# Patient Record
Sex: MALE | Race: WHITE | NOT HISPANIC OR LATINO | Employment: FULL TIME | URBAN - METROPOLITAN AREA
[De-identification: names, ages, dates, MRNs, and addresses within clinical notes are randomized per-mention and may not be internally consistent; named-entity substitution may affect disease eponyms.]

---

## 2017-01-03 ENCOUNTER — OFFICE VISIT (OUTPATIENT)
Dept: BARIATRICS/WEIGHT MGMT | Facility: CLINIC | Age: 52
End: 2017-01-03

## 2017-01-03 VITALS
DIASTOLIC BLOOD PRESSURE: 100 MMHG | BODY MASS INDEX: 38.36 KG/M2 | RESPIRATION RATE: 20 BRPM | HEIGHT: 76 IN | WEIGHT: 315 LBS | HEART RATE: 79 BPM | OXYGEN SATURATION: 98 % | TEMPERATURE: 98.2 F | SYSTOLIC BLOOD PRESSURE: 178 MMHG

## 2017-01-03 DIAGNOSIS — E03.9 HYPOTHYROIDISM, UNSPECIFIED TYPE: ICD-10-CM

## 2017-01-03 DIAGNOSIS — E66.01 MORBID OBESITY, UNSPECIFIED OBESITY TYPE (HCC): ICD-10-CM

## 2017-01-03 DIAGNOSIS — I10 ESSENTIAL HYPERTENSION: ICD-10-CM

## 2017-01-03 DIAGNOSIS — Z98.84 S/P BARIATRIC SURGERY: ICD-10-CM

## 2017-01-03 DIAGNOSIS — E55.9 VITAMIN D DEFICIENCY: Primary | ICD-10-CM

## 2017-01-03 DIAGNOSIS — R53.83 FATIGUE, UNSPECIFIED TYPE: ICD-10-CM

## 2017-01-03 DIAGNOSIS — M25.50 ARTHRALGIA, UNSPECIFIED JOINT: ICD-10-CM

## 2017-01-03 PROCEDURE — 99214 OFFICE O/P EST MOD 30 MIN: CPT | Performed by: PHYSICIAN ASSISTANT

## 2017-01-03 RX ORDER — METAXALONE 800 MG/1
TABLET ORAL
Refills: 5 | COMMUNITY
Start: 2016-12-23

## 2017-01-03 RX ORDER — DICLOFENAC SODIUM 75 MG/1
TABLET, DELAYED RELEASE ORAL
COMMUNITY
Start: 2016-12-27

## 2017-01-03 RX ORDER — URSODIOL 300 MG/1
CAPSULE ORAL
Refills: 5 | COMMUNITY
Start: 2016-12-23 | End: 2017-10-03

## 2017-01-03 RX ORDER — CYCLOBENZAPRINE HCL 10 MG
TABLET ORAL
Refills: 2 | COMMUNITY
Start: 2016-12-13

## 2017-01-03 NOTE — PROGRESS NOTES
[facility] Bariatric Surgery  [address]  Formerly [facility] [address]  [phone]        Patient Name:  Jefe Dominguez.  :  1965      Date of Visit: 1/3/2017      Reason for Visit:   4 months postop    now  HPI: Jefe Dominguez is a 51 y.o. male now 4 months s/p LSG/HHR by GDW on 16    Doing well.  No issues/concerns.  Getting >100g prot/day - eating meats, doing 2 protein bars/day and 2-3 premiere protein shakes/day.  1 month labs revealed low Vit D and low Phos.  Completed RX Vit D x 1 month, and semi-completed his Phos RX.  Taking MVI and iron.  On Actigall .  Not exercising d/t his joint/Charcot issues, but very active throughout the day.     Today's weight is (!) 395 lb 9.6 oz (179 kg) pounds, today's BMI is Body mass index is 48.15 kg/(m^2)., he has a  loss of 63 lbs since surgery        Past Medical History   Diagnosis Date   • CAD (coronary artery disease)      mild on cath 2016   • Charcot-Hamida-Tooth disease      does have some weakness in extremities w/ foot drop and gait disturbances.  Uses TEDs and has orthotics   • Elevated LFTs    • Foot drop, bilateral      WEARS BRACES BILATERALLY    • Heartburn    • Hypercholesteremia    • Hypertension      CONTROLLED WITH MEDS PER PT   • Hypothyroidism    • Morbid obesity    • OA (osteoarthritis)    • Pain in joint involving multiple sites    • Peripheral edema    • Sleep apnea      BiPAP compliant   • Wears glasses      Past Surgical History   Procedure Laterality Date   • [place] tooth extraction     • Colonoscopy     • Cardiac catheterization  2016     NO CORONARY STENTS PLACED    • Gastric sleeve laparoscopic N/A 2016     Procedure: GASTRIC SLEEVE LAPAROSCOPIC, LAP HIATAL HERNIA REPAIR;  Surgeon: Chavo Head MD;  Location: [facility];  Service:    • Wrist surgery Left      Outpatient Prescriptions Marked as Taking for the 1/3/17 encounter (Office Visit) with SLIME Bartlett   Medication  "Sig Dispense Refill   • cyclobenzaprine (FLEXERIL) 10 MG tablet TAKE 1/2 TO 1 TABLET BY MOUTH 3 TIMES A DAY AS NEEDED PAIN  2   • diclofenac (VOLTAREN) 75 MG EC tablet      • HYDROcodone-acetaminophen (NORCO) 5-325 MG per tablet Take 1 tablet by mouth every night.     • levothyroxine (SYNTHROID, LEVOTHROID) 75 MCG tablet Take 75 mcg by mouth daily.     • metaxalone (SKELAXIN) 800 MG tablet TAKE 1 TABLET BY MOUTH 4 TIMES A DAY AS NEEDED FOR PAIN  5   • metoprolol tartrate (LOPRESSOR) 50 MG tablet Take 50 mg by mouth daily.     • Multiple Vitamins-Minerals (MULTIVITAMIN PO) Take 1 tablet by mouth daily.     • ursodiol (ACTIGALL) 300 MG capsule TAKE 1 CAPSULE TWICE A DAY ( START ONE WEEK AFTER SURGERY FOR SIX MONTHS)  5     No Known Allergies  Social History     Social History   • Marital status:      Spouse name: N/A   • Number of children: N/A   • Years of education: N/A     Occupational History   • Not on file.     Social History Main Topics   • Smoking status: Never Smoker   • Smokeless tobacco: Never Used   • Alcohol use No   • Drug use: No   • Sexual activity: Not on file      Comment:      Other Topics Concern   • Not on file     Social History Narrative       Visit Vitals   • /100  Comment: blood pressure recheck manually   • Pulse 79   • Temp 98.2 °F (36.8 °C) (Temporal Artery )   • Resp 20   • Ht 76\" (193 cm)   • Wt (!) 395 lb 9.6 oz (179 kg)   • SpO2 98%   • BMI 48.15 kg/m2       Physical Exam   Constitutional: He appears well-developed and well-nourished.   Cardiovascular: Normal rate and regular rhythm.    Pulmonary/Chest: Effort normal.   Abdominal: Soft. There is no tenderness. No hernia.   Musculoskeletal: Normal range of motion.   Neurological: He is alert.   Skin: Skin is warm and dry.   Psychiatric: He has a normal mood and affect.         Assessment:  4 months s/p LSG/HHR by SEJAL on 9/1/16    ICD-10-CM ICD-9-CM   1. Vitamin D deficiency E55.9 268.9   2. Fatigue, unspecified type " R53.83 780.79   3. Hypothyroidism, unspecified type E03.9 244.9   4. Arthralgia, unspecified joint M25.50 719.40   5. Essential hypertension I10 401.9   6. Morbid obesity, unspecified obesity type E66.01 278.01   7. S/P bariatric surgery Z98.84 V45.86         Plan: Doing well. Continue w/ good food choices and healthy habits.  Continue protein >70g/day.  Start exercising as able.  Routine bariatric labs ordered.  Continue vitamins w/ adjustments pending lab results.  RTC sooner w/ problems.     The patient was instructed to follow up in 3 months .

## 2017-01-06 LAB
25(OH)D3+25(OH)D2 SERPL-MCNC: 14.6 NG/ML
ALBUMIN SERPL-MCNC: 4.4 G/DL (ref 3.2–4.8)
ALBUMIN/GLOB SERPL: 1.5 G/DL (ref 1.5–2.5)
ALP SERPL-CCNC: 106 U/L (ref 25–100)
ALT SERPL-CCNC: 20 U/L (ref 7–40)
AST SERPL-CCNC: 19 U/L (ref 0–33)
BASOPHILS # BLD AUTO: 0 10*3/MM3 (ref 0–0.2)
BASOPHILS NFR BLD AUTO: 0 % (ref 0–1)
BILIRUB SERPL-MCNC: 0.5 MG/DL (ref 0.3–1.2)
BUN SERPL-MCNC: 19 MG/DL (ref 9–23)
BUN/CREAT SERPL: 23.8 (ref 7–25)
CALCIUM SERPL-MCNC: 9.7 MG/DL (ref 8.7–10.4)
CHLORIDE SERPL-SCNC: 106 MMOL/L (ref 99–109)
CO2 SERPL-SCNC: 32 MMOL/L (ref 20–31)
CREAT SERPL-MCNC: 0.8 MG/DL (ref 0.6–1.3)
EOSINOPHIL # BLD AUTO: 0.05 10*3/MM3 (ref 0.1–0.3)
EOSINOPHIL NFR BLD AUTO: 0.7 % (ref 0–3)
ERYTHROCYTE [DISTWIDTH] IN BLOOD BY AUTOMATED COUNT: 14.7 % (ref 11.3–14.5)
FERRITIN SERPL-MCNC: 68 NG/ML (ref 22–322)
FOLATE SERPL-MCNC: >24 NG/ML (ref 3.2–20)
GLOBULIN SER CALC-MCNC: 2.9 GM/DL
GLUCOSE SERPL-MCNC: 90 MG/DL (ref 70–100)
HCT VFR BLD AUTO: 48.4 % (ref 38.9–50.9)
HGB BLD-MCNC: 14.9 G/DL (ref 13.1–17.5)
IMM GRANULOCYTES # BLD: 0.01 10*3/MM3 (ref 0–0.03)
IMM GRANULOCYTES NFR BLD: 0.1 % (ref 0–0.6)
IRON SERPL-MCNC: 102 MCG/DL (ref 50–175)
LYMPHOCYTES # BLD AUTO: 0.87 10*3/MM3 (ref 0.6–4.8)
LYMPHOCYTES NFR BLD AUTO: 12.8 % (ref 24–44)
MCH RBC QN AUTO: 30.2 PG (ref 27–31)
MCHC RBC AUTO-ENTMCNC: 30.8 G/DL (ref 32–36)
MCV RBC AUTO: 98.2 FL (ref 80–99)
METHYLMALONATE SERPL-SCNC: 147 NMOL/L (ref 0–378)
MONOCYTES # BLD AUTO: 0.68 10*3/MM3 (ref 0–1)
MONOCYTES NFR BLD AUTO: 10 % (ref 0–12)
NEUTROPHILS # BLD AUTO: 5.18 10*3/MM3 (ref 1.5–8.3)
NEUTROPHILS NFR BLD AUTO: 76.4 % (ref 41–71)
PHOSPHATE SERPL-MCNC: 2.6 MG/DL (ref 2.4–5.1)
PLATELET # BLD AUTO: 181 10*3/MM3 (ref 150–450)
POTASSIUM SERPL-SCNC: 4.4 MMOL/L (ref 3.5–5.5)
PREALB SERPL-MCNC: 33 MG/DL (ref 10–36)
PROT SERPL-MCNC: 7.3 G/DL (ref 5.7–8.2)
RBC # BLD AUTO: 4.93 10*6/MM3 (ref 4.2–5.76)
SODIUM SERPL-SCNC: 143 MMOL/L (ref 132–146)
VIT B1 BLD-SCNC: 221.6 NMOL/L (ref 66.5–200)
WBC # BLD AUTO: 6.79 10*3/MM3 (ref 3.5–10.8)

## 2017-01-09 RX ORDER — ERGOCALCIFEROL 1.25 MG/1
50000 CAPSULE ORAL
Qty: 12 CAPSULE | Refills: 0 | Status: SHIPPED | OUTPATIENT
Start: 2017-01-09 | End: 2017-03-28

## 2017-04-04 ENCOUNTER — OFFICE VISIT (OUTPATIENT)
Dept: BARIATRICS/WEIGHT MGMT | Facility: CLINIC | Age: 52
End: 2017-04-04

## 2017-04-04 VITALS
SYSTOLIC BLOOD PRESSURE: 149 MMHG | HEIGHT: 76 IN | HEART RATE: 78 BPM | TEMPERATURE: 97.1 F | DIASTOLIC BLOOD PRESSURE: 84 MMHG | OXYGEN SATURATION: 98 % | BODY MASS INDEX: 38.36 KG/M2 | WEIGHT: 315 LBS | RESPIRATION RATE: 24 BRPM

## 2017-04-04 DIAGNOSIS — Z98.84 S/P BARIATRIC SURGERY: ICD-10-CM

## 2017-04-04 DIAGNOSIS — I10 ESSENTIAL HYPERTENSION: ICD-10-CM

## 2017-04-04 DIAGNOSIS — E55.9 VITAMIN D DEFICIENCY: Primary | ICD-10-CM

## 2017-04-04 DIAGNOSIS — M25.50 ARTHRALGIA, UNSPECIFIED JOINT: ICD-10-CM

## 2017-04-04 DIAGNOSIS — E66.01 MORBID OBESITY, UNSPECIFIED OBESITY TYPE (HCC): ICD-10-CM

## 2017-04-04 DIAGNOSIS — R53.83 FATIGUE, UNSPECIFIED TYPE: ICD-10-CM

## 2017-04-04 PROCEDURE — 99213 OFFICE O/P EST LOW 20 MIN: CPT | Performed by: PHYSICIAN ASSISTANT

## 2017-04-04 NOTE — PROGRESS NOTES
"[facility] Bariatric Surgery  [address]  [facility] [address] [phone]        Patient Name:  Jefe Dominguez.  :  1965      Date of Visit: 2017      Reason for Visit:   7 months postop      HPI: Jefe Dominguez is a 52 y.o. male s/p LSG/HHR by GDW on 16.    Doing well.  No issues/concerns.  Says he knows he is not the poster child for WLS, but he is happy, he is glad he had surgery and \"I'm living.\"  Getting >100g prot/day - eating peanuts, protein bars and protein shakes as snacks.  Labs 1/3/17 revealed low Vit D (14.6), o/w WNL.  Doing wkly Vit D RX and continues on MVI and iron.  Still not exercising d/t his joint/Charcot issues, but very active throughout the day, and mobility continues to improve.  Plans to start swimming this summer.       Presurgery weight:  454 lbs.  Today's weight is (!) 382 lb (173 kg) pounds, today's BMI is Body mass index is 46.5 kg/(m^2)., he has a  loss of 72 lbs since surgery        Past Medical History:   Diagnosis Date   • CAD (coronary artery disease)     mild on cath 2016   • Charcot-Hamida-Tooth disease     does have some weakness in extremities w/ foot drop and gait disturbances.  Uses TEDs and has orthotics   • Elevated LFTs    • Foot drop, bilateral     WEARS BRACES BILATERALLY    • Heartburn    • Hypercholesteremia    • Hypertension     CONTROLLED WITH MEDS PER PT   • Hypothyroidism    • Morbid obesity    • OA (osteoarthritis)    • Pain in joint involving multiple sites    • Peripheral edema    • Sleep apnea     BiPAP compliant   • Wears glasses      Past Surgical History:   Procedure Laterality Date   • CARDIAC CATHETERIZATION  2016    NO CORONARY STENTS PLACED    • COLONOSCOPY     • GASTRIC SLEEVE LAPAROSCOPIC N/A 2016    Procedure: GASTRIC SLEEVE LAPAROSCOPIC, LAP HIATAL HERNIA REPAIR;  Surgeon: Chavo Head MD;  Location: [facility];  Service:    • WISDOM TOOTH EXTRACTION     • WRIST SURGERY " "Left      Outpatient Prescriptions Marked as Taking for the 4/4/17 encounter (Office Visit) with SLIME Bartlett   Medication Sig Dispense Refill   • cyclobenzaprine (FLEXERIL) 10 MG tablet TAKE 1/2 TO 1 TABLET BY MOUTH 3 TIMES A DAY AS NEEDED PAIN  2   • diclofenac (VOLTAREN) 75 MG EC tablet      • HYDROcodone-acetaminophen (NORCO) 5-325 MG per tablet Take 1 tablet by mouth every night.     • levothyroxine (SYNTHROID, LEVOTHROID) 75 MCG tablet Take 75 mcg by mouth daily.     • metaxalone (SKELAXIN) 800 MG tablet TAKE 1 TABLET BY MOUTH 4 TIMES A DAY AS NEEDED FOR PAIN  5   • metoprolol tartrate (LOPRESSOR) 50 MG tablet Take 50 mg by mouth daily.     • Multiple Vitamins-Minerals (MULTIVITAMIN PO) Take 1 tablet by mouth daily.       No Known Allergies  Social History     Social History   • Marital status:      Spouse name: N/A   • Number of children: N/A   • Years of education: N/A     Occupational History   • Not on file.     Social History Main Topics   • Smoking status: Never Smoker   • Smokeless tobacco: Never Used   • Alcohol use No   • Drug use: No   • Sexual activity: Not on file      Comment:      Other Topics Concern   • Not on file     Social History Narrative       /84 (BP Location: Left arm, Patient Position: Sitting, Cuff Size: Large Adult)  Pulse 78  Temp 97.1 °F (36.2 °C) (Temporal Artery )   Resp 24  Ht 76\" (193 cm)  Wt (!) 382 lb (173 kg)  SpO2 98%  BMI 46.5 kg/m2    Physical Exam   Constitutional: He appears well-developed and well-nourished.   Cardiovascular: Normal rate and regular rhythm.    Pulmonary/Chest: Effort normal.   Abdominal: Soft. There is no tenderness. No hernia.   Musculoskeletal: Normal range of motion.   Neurological: He is alert.   Skin: Skin is warm and dry.   Psychiatric: He has a normal mood and affect.         Assessment:  7 months s/p LSG/HHR by SEJAL on 9/1/16    ICD-10-CM ICD-9-CM   1. Vitamin D deficiency E55.9 268.9   2. Fatigue, unspecified " type R53.83 780.79   3. Essential hypertension I10 401.9   4. Arthralgia, unspecified joint M25.50 719.40   5. Morbid obesity, unspecified obesity type E66.01 278.01   6. S/P bariatric surgery Z98.84 V45.86         Plan:  Doing well. Continue w/ good food choices and healthy habits.  Continue protein >70g/day.  Start exercising/swimming as discussed.  Routine bariatric labs ordered.  Continue vitamins w/ adjustments pending lab results.  RTC sooner w/ problems.     The patient was instructed to follow up in 6 months .

## 2017-04-07 LAB
25(OH)D3+25(OH)D2 SERPL-MCNC: 28.5 NG/ML
ALBUMIN SERPL-MCNC: 4.2 G/DL (ref 3.2–4.8)
ALBUMIN/GLOB SERPL: 1.4 G/DL (ref 1.5–2.5)
ALP SERPL-CCNC: 89 U/L (ref 25–100)
ALT SERPL-CCNC: 24 U/L (ref 7–40)
AST SERPL-CCNC: 22 U/L (ref 0–33)
BASOPHILS # BLD AUTO: 0.01 10*3/MM3 (ref 0–0.2)
BASOPHILS NFR BLD AUTO: 0.2 % (ref 0–1)
BILIRUB SERPL-MCNC: 0.5 MG/DL (ref 0.3–1.2)
BUN SERPL-MCNC: 19 MG/DL (ref 9–23)
BUN/CREAT SERPL: 23.8 (ref 7–25)
CALCIUM SERPL-MCNC: 9.9 MG/DL (ref 8.7–10.4)
CHLORIDE SERPL-SCNC: 103 MMOL/L (ref 99–109)
CO2 SERPL-SCNC: 32 MMOL/L (ref 20–31)
CREAT SERPL-MCNC: 0.8 MG/DL (ref 0.6–1.3)
EOSINOPHIL # BLD AUTO: 0.04 10*3/MM3 (ref 0.1–0.3)
EOSINOPHIL NFR BLD AUTO: 0.6 % (ref 0–3)
ERYTHROCYTE [DISTWIDTH] IN BLOOD BY AUTOMATED COUNT: 13.7 % (ref 11.3–14.5)
FERRITIN SERPL-MCNC: 66 NG/ML (ref 22–322)
GLOBULIN SER CALC-MCNC: 2.9 GM/DL
GLUCOSE SERPL-MCNC: 85 MG/DL (ref 70–100)
HCT VFR BLD AUTO: 48.7 % (ref 38.9–50.9)
HGB BLD-MCNC: 16 G/DL (ref 13.1–17.5)
IMM GRANULOCYTES # BLD: 0.01 10*3/MM3 (ref 0–0.03)
IMM GRANULOCYTES NFR BLD: 0.2 % (ref 0–0.6)
LYMPHOCYTES # BLD AUTO: 1.07 10*3/MM3 (ref 0.6–4.8)
LYMPHOCYTES NFR BLD AUTO: 16.8 % (ref 24–44)
MCH RBC QN AUTO: 32.1 PG (ref 27–31)
MCHC RBC AUTO-ENTMCNC: 32.9 G/DL (ref 32–36)
MCV RBC AUTO: 97.6 FL (ref 80–99)
METHYLMALONATE SERPL-SCNC: 143 NMOL/L (ref 0–378)
MONOCYTES # BLD AUTO: 0.54 10*3/MM3 (ref 0–1)
MONOCYTES NFR BLD AUTO: 8.5 % (ref 0–12)
NEUTROPHILS # BLD AUTO: 4.7 10*3/MM3 (ref 1.5–8.3)
NEUTROPHILS NFR BLD AUTO: 73.7 % (ref 41–71)
PLATELET # BLD AUTO: 182 10*3/MM3 (ref 150–450)
POTASSIUM SERPL-SCNC: 4.2 MMOL/L (ref 3.5–5.5)
PROT SERPL-MCNC: 7.1 G/DL (ref 5.7–8.2)
RBC # BLD AUTO: 4.99 10*6/MM3 (ref 4.2–5.76)
SODIUM SERPL-SCNC: 143 MMOL/L (ref 132–146)
VIT B1 BLD-SCNC: 252.6 NMOL/L (ref 66.5–200)
WBC # BLD AUTO: 6.37 10*3/MM3 (ref 3.5–10.8)

## 2017-04-07 RX ORDER — ERGOCALCIFEROL 1.25 MG/1
50000 CAPSULE ORAL WEEKLY
Qty: 12 CAPSULE | Refills: 0 | Status: SHIPPED | OUTPATIENT
Start: 2017-04-07 | End: 2018-05-02

## 2017-10-03 ENCOUNTER — OFFICE VISIT (OUTPATIENT)
Dept: BARIATRICS/WEIGHT MGMT | Facility: CLINIC | Age: 52
End: 2017-10-03

## 2017-10-03 VITALS
HEART RATE: 72 BPM | RESPIRATION RATE: 18 BRPM | OXYGEN SATURATION: 99 % | WEIGHT: 315 LBS | HEIGHT: 76 IN | TEMPERATURE: 98.1 F | BODY MASS INDEX: 38.36 KG/M2 | DIASTOLIC BLOOD PRESSURE: 98 MMHG | SYSTOLIC BLOOD PRESSURE: 152 MMHG

## 2017-10-03 DIAGNOSIS — Z98.84 S/P BARIATRIC SURGERY: ICD-10-CM

## 2017-10-03 DIAGNOSIS — E03.9 HYPOTHYROIDISM, UNSPECIFIED TYPE: ICD-10-CM

## 2017-10-03 DIAGNOSIS — E55.9 VITAMIN D DEFICIENCY: ICD-10-CM

## 2017-10-03 DIAGNOSIS — R53.83 FATIGUE, UNSPECIFIED TYPE: Primary | ICD-10-CM

## 2017-10-03 DIAGNOSIS — E78.00 HYPERCHOLESTEREMIA: ICD-10-CM

## 2017-10-03 DIAGNOSIS — E66.01 MORBID OBESITY (HCC): ICD-10-CM

## 2017-10-03 DIAGNOSIS — I10 ESSENTIAL HYPERTENSION: ICD-10-CM

## 2017-10-03 DIAGNOSIS — R79.0 ABNORMAL BLOOD LEVEL OF IRON: ICD-10-CM

## 2017-10-03 PROCEDURE — 99214 OFFICE O/P EST MOD 30 MIN: CPT | Performed by: PHYSICIAN ASSISTANT

## 2017-10-03 NOTE — PROGRESS NOTES
[facility] Bariatric Surgery  [address]  [facility] [address] [phone]        Patient Name:  Jefe Dominguez.  :  1965      Date of Visit: 10/3/2017      Reason for Visit:   Annual Eval    HPI: Jefe Dominguez is a 52 y.o. male 1 year s/p LSG/HHR by GDW on 16.    Doing pretty well from a bariatric standpoint - pleased w/ his progress.  Says down >100 lbs from his personal high.  Does want to lose more weight, but admits he just really doesn't have the desire to exercise.  Does not want to pursue any additional WLS.  No other issues/concerns.  Getting >100g prot/day - eating peanuts, protein bars and protein shakes as snacks.  Labs 17 revealed low Vit D (28.5), o/w WNL.  Doing wkly Vit D RX and continues on MVI and iron.  As above, still not exercising but remains active throughout the day.    Presurgery weight:  454 lbs.  Today's weight is (!) 385 lb (175 kg) pounds, today's BMI is Body mass index is 46.86 kg/(m^2)., he has a  loss of 69 lbs since surgery        Past Medical History:   Diagnosis Date   • CAD (coronary artery disease)     mild on cath 2016   • Charcot-Hamida-Tooth disease     does have some weakness in extremities w/ foot drop and gait disturbances.  Uses TEDs and has orthotics   • Dyspepsia    • Elevated LFTs    • Foot drop, bilateral     WEARS BRACES BILATERALLY    • Hypercholesteremia    • Hypertension    • Hypothyroidism    • Morbid obesity    • OA (osteoarthritis)    • Pain in joint involving multiple sites    • Peripheral edema    • Sleep apnea     BiPAP compliant   • Wears glasses      Past Surgical History:   Procedure Laterality Date   • CARDIAC CATHETERIZATION  2016    NO CORONARY STENTS PLACED    • COLONOSCOPY     • GASTRIC SLEEVE LAPAROSCOPIC N/A 2016    Procedure: GASTRIC SLEEVE LAPAROSCOPIC, LAP HIATAL HERNIA REPAIR;  Surgeon: Chavo Head MD;  [facility];  Service:    • WISDOM TOOTH EXTRACTION    "  • WRIST SURGERY Left      Outpatient Prescriptions Marked as Taking for the 10/3/17 encounter (Office Visit) with SLIME Bartlett   Medication Sig Dispense Refill   • cyclobenzaprine (FLEXERIL) 10 MG tablet TAKE 1/2 TO 1 TABLET BY MOUTH 3 TIMES A DAY AS NEEDED PAIN  2   • diclofenac (VOLTAREN) 75 MG EC tablet      • HYDROcodone-acetaminophen (NORCO) 5-325 MG per tablet Take 1 tablet by mouth every night.     • levothyroxine (SYNTHROID, LEVOTHROID) 75 MCG tablet Take 75 mcg by mouth daily.     • metaxalone (SKELAXIN) 800 MG tablet TAKE 1 TABLET BY MOUTH 4 TIMES A DAY AS NEEDED FOR PAIN  5   • metoprolol tartrate (LOPRESSOR) 50 MG tablet Take 50 mg by mouth daily.     • Multiple Vitamins-Minerals (MULTIVITAMIN PO) Take 1 tablet by mouth daily.     • vitamin D (ERGOCALCIFEROL) 44491 UNITS capsule capsule Take 1 capsule by mouth 1 (One) Time Per Week. 12 capsule 0     No Known Allergies  Social History     Social History   • Marital status:      Spouse name: N/A   • Number of children: N/A   • Years of education: N/A     Occupational History   • Not on file.     Social History Main Topics   • Smoking status: Never Smoker   • Smokeless tobacco: Never Used   • Alcohol use No   • Drug use: No   • Sexual activity: Not on file      Comment:      Other Topics Concern   • Not on file     Social History Narrative       /98 (BP Location: Left arm, Patient Position: Sitting, Cuff Size: Large Adult)  Pulse 72  Temp 98.1 °F (36.7 °C) (Temporal Artery )   Resp 18  Ht 76\" (193 cm)  Wt (!) 385 lb (175 kg)  SpO2 99%  BMI 46.86 kg/m2    Physical Exam   Constitutional: He appears well-developed and well-nourished.   Cardiovascular: Normal rate and regular rhythm.    Pulmonary/Chest: Effort normal.   Abdominal: Soft. There is no tenderness. No hernia.   Musculoskeletal: Normal range of motion. He exhibits edema.   (B) LE in braces   Neurological: He is alert.   Skin: Skin is warm and dry.   Psychiatric: " He has a normal mood and affect.         Assessment:  1 year s/p LSG/HHR by SEJAL on 9/1/16    ICD-10-CM ICD-9-CM   1. Fatigue, unspecified type R53.83 780.79   2. Essential hypertension I10 401.9   3. Hypercholesteremia E78.00 272.0   4. Hypothyroidism, unspecified type E03.9 244.9   5. Vitamin D deficiency E55.9 268.9   6. Abnormal blood level of iron R79.0 790.6   7. Morbid obesity E66.01 278.01   8. S/P bariatric surgery Z98.84 V45.86       Plan:  Doing well. Continue w/ good food choices and healthy habits.  Continue protein >70g/day.  Encouraged to start exercising.  Discussed BMR and dietitian eval but patient declines.  Routine bariatric labs ordered.  Continue vitamins w/ adjustments pending lab results.  RTC sooner w/ problems.     The patient was instructed to follow up in 6 months .     Total time spent w/ patient 25 minutes and 15 minutes spent counseling the patient on nutrition and necessary dietary/lifestyle modifications.

## 2017-10-06 LAB
25(OH)D3+25(OH)D2 SERPL-MCNC: 36 NG/ML
A-TOCOPHEROL VIT E SERPL-MCNC: 14 MG/L (ref 5.3–17.5)
ALBUMIN SERPL-MCNC: 4.3 G/DL (ref 3.2–4.8)
ALBUMIN/GLOB SERPL: 1.6 G/DL (ref 1.5–2.5)
ALP SERPL-CCNC: 85 U/L (ref 25–100)
ALT SERPL-CCNC: 28 U/L (ref 7–40)
AST SERPL-CCNC: 24 U/L (ref 0–33)
BASOPHILS # BLD AUTO: 0.01 10*3/MM3 (ref 0–0.2)
BASOPHILS NFR BLD AUTO: 0.2 % (ref 0–1)
BILIRUB SERPL-MCNC: 0.5 MG/DL (ref 0.3–1.2)
BUN SERPL-MCNC: 21 MG/DL (ref 9–23)
BUN/CREAT SERPL: 26.3 (ref 7–25)
CALCIUM SERPL-MCNC: 9.3 MG/DL (ref 8.7–10.4)
CHLORIDE SERPL-SCNC: 107 MMOL/L (ref 99–109)
CO2 SERPL-SCNC: 25 MMOL/L (ref 20–31)
CREAT SERPL-MCNC: 0.8 MG/DL (ref 0.6–1.3)
EOSINOPHIL # BLD AUTO: 0.05 10*3/MM3 (ref 0–0.3)
EOSINOPHIL NFR BLD AUTO: 0.9 % (ref 0–3)
ERYTHROCYTE [DISTWIDTH] IN BLOOD BY AUTOMATED COUNT: 13.8 % (ref 11.3–14.5)
FERRITIN SERPL-MCNC: 75 NG/ML (ref 22–322)
FOLATE SERPL-MCNC: >24 NG/ML (ref 3.2–20)
GLOBULIN SER CALC-MCNC: 2.7 GM/DL
GLUCOSE SERPL-MCNC: 70 MG/DL (ref 70–100)
HCT VFR BLD AUTO: 48.2 % (ref 38.9–50.9)
HGB BLD-MCNC: 15.5 G/DL (ref 13.1–17.5)
IMM GRANULOCYTES # BLD: 0.01 10*3/MM3 (ref 0–0.03)
IMM GRANULOCYTES NFR BLD: 0.2 % (ref 0–0.6)
IRON SERPL-MCNC: 145 MCG/DL (ref 50–175)
LYMPHOCYTES # BLD AUTO: 0.95 10*3/MM3 (ref 0.6–4.8)
LYMPHOCYTES NFR BLD AUTO: 17.1 % (ref 24–44)
MAGNESIUM SERPL-MCNC: 2.1 MG/DL (ref 1.3–2.7)
MCH RBC QN AUTO: 31.4 PG (ref 27–31)
MCHC RBC AUTO-ENTMCNC: 32.2 G/DL (ref 32–36)
MCV RBC AUTO: 97.8 FL (ref 80–99)
METHYLMALONATE SERPL-SCNC: 156 NMOL/L (ref 0–378)
MONOCYTES # BLD AUTO: 0.56 10*3/MM3 (ref 0–1)
MONOCYTES NFR BLD AUTO: 10.1 % (ref 0–12)
NEUTROPHILS # BLD AUTO: 3.96 10*3/MM3 (ref 1.5–8.3)
NEUTROPHILS NFR BLD AUTO: 71.5 % (ref 41–71)
PHOSPHATE SERPL-MCNC: 2.3 MG/DL (ref 2.4–5.1)
PLATELET # BLD AUTO: 164 10*3/MM3 (ref 150–450)
POTASSIUM SERPL-SCNC: 4.4 MMOL/L (ref 3.5–5.5)
PREALB SERPL-MCNC: 39 MG/DL (ref 10–36)
PROT SERPL-MCNC: 7 G/DL (ref 5.7–8.2)
PTH-INTACT SERPL-MCNC: 47 PG/ML (ref 15–65)
RBC # BLD AUTO: 4.93 10*6/MM3 (ref 4.2–5.76)
SODIUM SERPL-SCNC: 143 MMOL/L (ref 132–146)
TSH SERPL DL<=0.005 MIU/L-ACNC: 2.62 MIU/ML (ref 0.35–5.35)
VIT A SERPL-MCNC: 74 UG/DL (ref 24–85)
VIT B1 BLD-SCNC: 240.2 NMOL/L (ref 66.5–200)
WBC # BLD AUTO: 5.54 10*3/MM3 (ref 3.5–10.8)
ZINC SERPL-MCNC: 79 UG/DL (ref 56–134)

## 2017-11-14 RX ORDER — ERGOCALCIFEROL 1.25 MG/1
CAPSULE ORAL
Qty: 12 CAPSULE | Refills: 0 | OUTPATIENT
Start: 2017-11-14

## 2018-05-02 ENCOUNTER — OFFICE VISIT (OUTPATIENT)
Dept: BARIATRICS/WEIGHT MGMT | Facility: CLINIC | Age: 53
End: 2018-05-02

## 2018-05-02 VITALS
TEMPERATURE: 96.5 F | HEART RATE: 86 BPM | RESPIRATION RATE: 18 BRPM | HEIGHT: 76 IN | DIASTOLIC BLOOD PRESSURE: 101 MMHG | OXYGEN SATURATION: 99 % | SYSTOLIC BLOOD PRESSURE: 191 MMHG | BODY MASS INDEX: 38.36 KG/M2 | WEIGHT: 315 LBS

## 2018-05-02 DIAGNOSIS — E55.9 HYPOVITAMINOSIS D: Primary | ICD-10-CM

## 2018-05-02 DIAGNOSIS — E66.01 OBESITY, CLASS III, BMI 40-49.9 (MORBID OBESITY) (HCC): ICD-10-CM

## 2018-05-02 DIAGNOSIS — K91.2 POSTGASTRECTOMY MALABSORPTION: ICD-10-CM

## 2018-05-02 DIAGNOSIS — R53.83 FATIGUE, UNSPECIFIED TYPE: ICD-10-CM

## 2018-05-02 DIAGNOSIS — Z98.84 STATUS POST BARIATRIC SURGERY: ICD-10-CM

## 2018-05-02 DIAGNOSIS — Z13.0 SCREENING, IRON DEFICIENCY ANEMIA: ICD-10-CM

## 2018-05-02 DIAGNOSIS — Z13.21 MALNUTRITION SCREEN: ICD-10-CM

## 2018-05-02 DIAGNOSIS — Z90.3 POSTGASTRECTOMY MALABSORPTION: ICD-10-CM

## 2018-05-02 PROCEDURE — 99214 OFFICE O/P EST MOD 30 MIN: CPT | Performed by: PHYSICIAN ASSISTANT

## 2018-05-02 NOTE — PROGRESS NOTES
"[facility] Bariatric Surgery  [address]  [facility] [address] [phone]        Patient Name:  Jefe Dominguez.  :  1965      Date of Visit: 2018      Reason for Visit:   Annual Eval  1.5 years postop    HPI: Jefe Dominguez is a 53 y.o. male 1.5 year s/p LSG/HHR by GDW on 16    LOV 10/3/17-was doing well.      Doing well.  Overall, he does have better mobility since WLS. He is happy with weightloss, though would like to be 325lb. States it is \"his own fault\". Rather than checking labels and planning meals, he eats on the go. Also does not exercise, mobility and stregth limited with CMT, but states it is possible to get exercise. 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  No issues/concerns. Denies dysphagia, reflux, nausea, vomiting and abdominal pain.  Getting 100+g prot/day. Protein shake in am, protein bar at work.  Peanuts, brown beans, peanut butter crackers. Doesn't eat really meals, mostly eating through the day.   Drinking 32-48oz fluid oz/day, water, juice.  Last labs revealed low phosphorus, otherwise bariatric labs okay .  Taking MVI and iron.  Does not require antacid, rare need for tums.  Not exercising.     Presurgery weight: 454 pounds.  Today's weight is (!) 181 kg (398 lb 0.2 oz) pounds, today's  Body mass index is 48.45 kg/m²., and his weight loss since surgery is 56 pounds.  13lb up from LOV.     Past Medical History:   Diagnosis Date   • CAD (coronary artery disease)     mild on cath 2016   • Charcot-Hamida-Tooth disease     does have some weakness in extremities w/ foot drop and gait disturbances.  Uses TEDs and has orthotics   • Dyspepsia    • Elevated LFTs    • Foot drop, bilateral     WEARS BRACES BILATERALLY    • Hypercholesteremia    • Hypertension    • Hypothyroidism    • Morbid obesity    • OA (osteoarthritis)    • Pain in joint involving multiple sites    • Peripheral edema    • Sleep apnea     BiPAP " "compliant   • Wears glasses      Past Surgical History:   Procedure Laterality Date   • CARDIAC CATHETERIZATION  05/2016    NO CORONARY STENTS PLACED    • COLONOSCOPY  2016   • GASTRIC SLEEVE LAPAROSCOPIC N/A 9/1/2016    Procedure: GASTRIC SLEEVE LAPAROSCOPIC, LAP HIATAL HERNIA REPAIR;  Surgeon: Chavo Head MD;  Location: [facility];  Service:    • WISDOM TOOTH EXTRACTION     • WRIST SURGERY Left      Outpatient Prescriptions Marked as Taking for the 5/2/18 encounter (Office Visit) with Kay Blevins PA-C   Medication Sig Dispense Refill   • cyclobenzaprine (FLEXERIL) 10 MG tablet TAKE 1/2 TO 1 TABLET BY MOUTH 3 TIMES A DAY AS NEEDED PAIN  2   • diclofenac (VOLTAREN) 75 MG EC tablet      • HYDROcodone-acetaminophen (NORCO) 5-325 MG per tablet Take 1 tablet by mouth every night.     • levothyroxine (SYNTHROID, LEVOTHROID) 75 MCG tablet Take 75 mcg by mouth daily.     • metaxalone (SKELAXIN) 800 MG tablet TAKE 1 TABLET BY MOUTH 4 TIMES A DAY AS NEEDED FOR PAIN  5   • metoprolol tartrate (LOPRESSOR) 50 MG tablet Take 50 mg by mouth daily.     • Multiple Vitamins-Minerals (MULTIVITAMIN PO) Take 1 tablet by mouth daily.         No Known Allergies    Social History     Social History   • Marital status:      Spouse name: N/A   • Number of children: N/A   • Years of education: N/A     Occupational History   • Not on file.     Social History Main Topics   • Smoking status: Never Smoker   • Smokeless tobacco: Never Used   • Alcohol use No   • Drug use: No   • Sexual activity: Not on file      Comment:      Other Topics Concern   • Not on file     Social History Narrative   • No narrative on file       BP (!) 191/101 (BP Location: Left arm, Patient Position: Sitting, Cuff Size: Large Adult)   Pulse 86   Temp 96.5 °F (35.8 °C) (Temporal Artery )   Resp 18   Ht 193 cm (76\")   Wt (!) 181 kg (398 lb 0.2 oz)   SpO2 99%   BMI 48.45 kg/m²     Physical Exam   Constitutional: He is oriented to person, " place, and time. He appears well-developed and well-nourished.   HENT:   Head: Normocephalic and atraumatic.   Cardiovascular: Normal rate, regular rhythm and normal heart sounds.    Pulmonary/Chest: Effort normal and breath sounds normal. No respiratory distress. He has no wheezes.   Abdominal: Soft. Bowel sounds are normal. He exhibits no distension. There is no tenderness.   Incisions healing well   Musculoskeletal:   BLE in braces, weak hand    Neurological: He is alert and oriented to person, place, and time.   Skin: Skin is warm and dry.   Psychiatric: He has a normal mood and affect. His behavior is normal. Judgment and thought content normal.         Assessment:  1.5 year s/p LSG/HHR by SEJAL on 9/1/16     ICD-10-CM ICD-9-CM   1. Hypovitaminosis D E55.9 268.9   2. Screening, iron deficiency anemia Z13.0 V78.0   3. Malnutrition screen Z13.21 V77.2   4. Postgastrectomy malabsorption K91.2 579.3    Z90.3    5. Obesity, Class III, BMI 40-49.9 (morbid obesity) E66.01 278.01   6. Fatigue, unspecified type R53.83 780.79   7. Status post bariatric surgery Z98.84 V45.86         Plan:  Refocus w/ good food choices and healthy habits. Advised 3 meals a day with healthy snacks.  Continue to focus on high protein, low carb.  Keep tracking intake. Target calorie is higher end of 2000 per tanita.  Encouraged routine exercise how able. Offered dietician consult, he will contact her if desires. Declined BMR.  Routine bariatric labs ordered.  Continue vitamins w/ adjustments pending lab results.  Call w/ problems/concerns.     The patient was instructed to follow up in  6 months sooner if needed.    HTN: discuss BP control with PCP.  BP was rechecked and had gone down.     Total time spent w/ patient 25 minutes and 15 minutes spent counseling the patient on nutrition and necessary dietary/lifestyle modifications.

## 2018-05-04 LAB
25(OH)D3+25(OH)D2 SERPL-MCNC: 19.9 NG/ML
ALBUMIN SERPL-MCNC: 4.3 G/DL (ref 3.2–4.8)
ALBUMIN/GLOB SERPL: 1.7 G/DL (ref 1.5–2.5)
ALP SERPL-CCNC: 72 U/L (ref 25–100)
ALT SERPL-CCNC: 26 U/L (ref 7–40)
AST SERPL-CCNC: 23 U/L (ref 0–33)
BASOPHILS # BLD AUTO: 0.02 10*3/MM3 (ref 0–0.2)
BASOPHILS NFR BLD AUTO: 0.2 % (ref 0–1)
BILIRUB SERPL-MCNC: 0.5 MG/DL (ref 0.3–1.2)
BUN SERPL-MCNC: 19 MG/DL (ref 9–23)
BUN/CREAT SERPL: 21.1 (ref 7–25)
CALCIUM SERPL-MCNC: 9.2 MG/DL (ref 8.7–10.4)
CHLORIDE SERPL-SCNC: 107 MMOL/L (ref 99–109)
CO2 SERPL-SCNC: 26 MMOL/L (ref 20–31)
CREAT SERPL-MCNC: 0.9 MG/DL (ref 0.6–1.3)
EOSINOPHIL # BLD AUTO: 0.06 10*3/MM3 (ref 0–0.3)
EOSINOPHIL NFR BLD AUTO: 0.7 % (ref 0–3)
ERYTHROCYTE [DISTWIDTH] IN BLOOD BY AUTOMATED COUNT: 14 % (ref 11.3–14.5)
FERRITIN SERPL-MCNC: 84 NG/ML (ref 22–322)
FOLATE SERPL-MCNC: >24 NG/ML (ref 3.2–20)
GFR SERPLBLD CREATININE-BSD FMLA CKD-EPI: 107 ML/MIN/1.73
GFR SERPLBLD CREATININE-BSD FMLA CKD-EPI: 88 ML/MIN/1.73
GLOBULIN SER CALC-MCNC: 2.6 GM/DL
GLUCOSE SERPL-MCNC: 94 MG/DL (ref 70–100)
HCT VFR BLD AUTO: 50 % (ref 38.9–50.9)
HGB BLD-MCNC: 16 G/DL (ref 13.1–17.5)
IMM GRANULOCYTES # BLD: 0.03 10*3/MM3 (ref 0–0.03)
IMM GRANULOCYTES NFR BLD: 0.4 % (ref 0–0.6)
IRON SERPL-MCNC: 121 MCG/DL (ref 50–175)
LYMPHOCYTES # BLD AUTO: 1.12 10*3/MM3 (ref 0.6–4.8)
LYMPHOCYTES NFR BLD AUTO: 13.2 % (ref 24–44)
MCH RBC QN AUTO: 31 PG (ref 27–31)
MCHC RBC AUTO-ENTMCNC: 32 G/DL (ref 32–36)
MCV RBC AUTO: 96.9 FL (ref 80–99)
METHYLMALONATE SERPL-SCNC: 157 NMOL/L (ref 0–378)
MONOCYTES # BLD AUTO: 0.91 10*3/MM3 (ref 0–1)
MONOCYTES NFR BLD AUTO: 10.8 % (ref 0–12)
NEUTROPHILS # BLD AUTO: 6.32 10*3/MM3 (ref 1.5–8.3)
NEUTROPHILS NFR BLD AUTO: 74.7 % (ref 41–71)
PHOSPHATE SERPL-MCNC: 2.9 MG/DL (ref 2.4–5.1)
PLATELET # BLD AUTO: 186 10*3/MM3 (ref 150–450)
POTASSIUM SERPL-SCNC: 4 MMOL/L (ref 3.5–5.5)
PREALB SERPL-MCNC: 40 MG/DL (ref 10–36)
PROT SERPL-MCNC: 6.9 G/DL (ref 5.7–8.2)
RBC # BLD AUTO: 5.16 10*6/MM3 (ref 4.2–5.76)
SODIUM SERPL-SCNC: 143 MMOL/L (ref 132–146)
VIT B1 BLD-SCNC: 196.4 NMOL/L (ref 66.5–200)
WBC # BLD AUTO: 8.46 10*3/MM3 (ref 3.5–10.8)

## 2018-05-07 RX ORDER — ERGOCALCIFEROL 1.25 MG/1
50000 CAPSULE ORAL WEEKLY
Qty: 12 CAPSULE | Refills: 0 | Status: SHIPPED | OUTPATIENT
Start: 2018-05-07

## 2018-07-20 RX ORDER — ERGOCALCIFEROL 1.25 MG/1
50000 CAPSULE ORAL WEEKLY
Qty: 12 CAPSULE | Refills: 0 | OUTPATIENT
Start: 2018-07-20

## 2025-01-30 ENCOUNTER — READMISSION MANAGEMENT (OUTPATIENT)
Dept: CALL CENTER | Facility: HOSPITAL | Age: 60
End: 2025-01-30
Payer: COMMERCIAL

## 2025-01-30 NOTE — OUTREACH NOTE
Prep Survey      Flowsheet Row Responses   [facility] facility patient discharged from? Non-BH   Is LACE score < 7 ? Non-BH Discharge   Eligibility Not Eligible   What are the reasons patient is not eligible? Other  [NON [place] PROVIDER_ LAN_CARDS]   Does the patient have one of the following disease processes/diagnoses(primary or secondary)? Other   Prep survey completed? Yes            Anastasiya SALDANA - Registered Nurse